# Patient Record
Sex: FEMALE | Race: OTHER
[De-identification: names, ages, dates, MRNs, and addresses within clinical notes are randomized per-mention and may not be internally consistent; named-entity substitution may affect disease eponyms.]

---

## 2018-05-23 ENCOUNTER — RESULT REVIEW (OUTPATIENT)
Age: 48
End: 2018-05-23

## 2018-08-02 PROBLEM — Z00.00 ENCOUNTER FOR PREVENTIVE HEALTH EXAMINATION: Status: ACTIVE | Noted: 2018-08-02

## 2018-08-09 ENCOUNTER — MOBILE ON CALL (OUTPATIENT)
Age: 48
End: 2018-08-09

## 2018-08-23 ENCOUNTER — APPOINTMENT (OUTPATIENT)
Dept: GYNECOLOGIC ONCOLOGY | Facility: CLINIC | Age: 48
End: 2018-08-23
Payer: COMMERCIAL

## 2018-08-23 PROCEDURE — 99203 OFFICE O/P NEW LOW 30 MIN: CPT | Mod: 25

## 2018-08-23 PROCEDURE — 58100 BIOPSY OF UTERUS LINING: CPT | Mod: 59

## 2018-08-23 PROCEDURE — 93976 VASCULAR STUDY: CPT | Mod: 59

## 2018-08-23 PROCEDURE — 76830 TRANSVAGINAL US NON-OB: CPT | Mod: 59

## 2018-09-07 LAB — CORE LAB BIOPSY: NORMAL

## 2018-09-27 ENCOUNTER — APPOINTMENT (OUTPATIENT)
Dept: GYNECOLOGIC ONCOLOGY | Facility: CLINIC | Age: 48
End: 2018-09-27
Payer: COMMERCIAL

## 2018-09-27 PROCEDURE — 99214 OFFICE O/P EST MOD 30 MIN: CPT

## 2019-10-10 ENCOUNTER — APPOINTMENT (OUTPATIENT)
Dept: GYNECOLOGIC ONCOLOGY | Facility: CLINIC | Age: 49
End: 2019-10-10
Payer: COMMERCIAL

## 2019-10-10 DIAGNOSIS — D21.9 BENIGN NEOPLASM OF CONNECTIVE AND OTHER SOFT TISSUE, UNSPECIFIED: ICD-10-CM

## 2019-10-10 DIAGNOSIS — N94.89 OTHER SPECIFIED CONDITIONS ASSOCIATED WITH FEMALE GENITAL ORGANS AND MENSTRUAL CYCLE: ICD-10-CM

## 2019-10-10 PROCEDURE — 76857 US EXAM PELVIC LIMITED: CPT | Mod: 59

## 2019-10-10 PROCEDURE — 93976 VASCULAR STUDY: CPT | Mod: 59

## 2019-10-10 PROCEDURE — 76830 TRANSVAGINAL US NON-OB: CPT | Mod: 59

## 2019-10-10 PROCEDURE — 99214 OFFICE O/P EST MOD 30 MIN: CPT | Mod: 25

## 2019-10-10 NOTE — DISCUSSION/SUMMARY
[FreeTextEntry1] : Discussed that only endometrial problems associated with bleeding will be seen on hysteroscopy D&C. I suspect adenomyosis which will not be diagnosed, but patient adamant she would like to check that first before going for hysterectomy.

## 2019-10-10 NOTE — ASSESSMENT
[FreeTextEntry1] : Pt is a 48 yo with menorrhagia and pelvic congestion syndrome and anemia. D&C hysteroscopy.

## 2019-10-10 NOTE — HISTORY OF PRESENT ILLNESS
[FreeTextEntry1] : Pt is a 48 yo with heavy menstrual bleeding and pelvic congestion syndrome who presents for follow up. She has anemia and would like to have dilation and curettage instead of hysterectomy right away. She would like to see if there is an endometrial cause of bleeding even though i suspect she has adenomyosis.

## 2019-11-13 ENCOUNTER — FORM ENCOUNTER (OUTPATIENT)
Age: 49
End: 2019-11-13

## 2019-11-14 ENCOUNTER — RESULT REVIEW (OUTPATIENT)
Age: 49
End: 2019-11-14

## 2019-11-26 ENCOUNTER — APPOINTMENT (OUTPATIENT)
Dept: GYNECOLOGIC ONCOLOGY | Facility: CLINIC | Age: 49
End: 2019-11-26
Payer: COMMERCIAL

## 2019-11-26 DIAGNOSIS — N92.0 EXCESSIVE AND FREQUENT MENSTRUATION WITH REGULAR CYCLE: ICD-10-CM

## 2019-11-26 PROCEDURE — 99212 OFFICE O/P EST SF 10 MIN: CPT

## 2019-11-26 NOTE — DISCUSSION/SUMMARY
[Clean] : was clean [Dry] : was dry [Intact] : was intact [Erythema] : was not erythematous [Ecchymosis] : was not ecchymotic [Seroma] : had no seroma [None] : had no drainage [Normal Skin] : normal appearance [Firm] : soft [Tender] : nontender [Abnormal Bowel Sounds] : normal bowel sounds [Rebound] : no rebound tenderness [Guarding] : no guarding [Incisional Hernia] : no incisional hernia [Mass] : no palpable mass [Doing Well] : is doing well [Excellent Pain Control] : has excellent pain control [No Sign of Infection] : is showing no signs of infection [0] : 0

## 2019-11-26 NOTE — REASON FOR VISIT
[de-identified] : 11/26/2019 [de-identified] : D&C [de-identified] : Pt presents for post-op check. She reports bleeding after the surgery which now only spotting, but she thinks it is because she is having a period.

## 2019-11-26 NOTE — ASSESSMENT
[FreeTextEntry1] : Pt is a 50 yo s/p D&C for menorrhagia. Pathology benign. Will re-evaluate if her periods are heavy persistently or if the D&C improved her flow and make a decision of she would like to proceed with hysterectomy.

## 2024-03-25 ENCOUNTER — OFFICE (OUTPATIENT)
Dept: URBAN - METROPOLITAN AREA CLINIC 8 | Facility: CLINIC | Age: 54
Setting detail: OPHTHALMOLOGY
End: 2024-03-25
Payer: MEDICAID

## 2024-03-25 DIAGNOSIS — H52.4: ICD-10-CM

## 2024-03-25 DIAGNOSIS — Q13.3: ICD-10-CM

## 2024-03-25 PROCEDURE — 92015 DETERMINE REFRACTIVE STATE: CPT | Performed by: OPHTHALMOLOGY

## 2024-03-25 PROCEDURE — 92014 COMPRE OPH EXAM EST PT 1/>: CPT | Performed by: OPHTHALMOLOGY

## 2024-03-25 PROCEDURE — 92083 EXTENDED VISUAL FIELD XM: CPT | Performed by: OPHTHALMOLOGY

## 2024-03-26 PROBLEM — E22.1 HYPERPROLACTINEMIA: Status: ACTIVE | Noted: 2024-03-25

## 2024-03-26 ASSESSMENT — REFRACTION_MANIFEST
OD_ADD: +2.00
OS_SPHERE: BALANCE
OD_SPHERE: +0.50
OU_VA: 20/20+2
OS_SPHERE: BALANCE
OS_VA1: CF 2FT
OD_CYLINDER: -0.25
OS_ADD: +2.00
OD_VA1: 20/20+2
OS_VA2: CF 2FT
OD_CYLINDER: -0.25
OS_VA1: CF 2FT
OS_VA2: CF 2FT
OS_ADD: +2.00
OD_SPHERE: +0.50
OD_AXIS: 010
OD_AXIS: 010
OD_VA1: 20/20+2
OD_VA2: 20/20(J1+)
OD_ADD: +2.00
OD_VA2: 20/20(J1+)
OU_VA: 20/20+2

## 2024-03-26 ASSESSMENT — REFRACTION_CURRENTRX
OS_SPHERE: +1.25
OD_OVR_VA: 20/
OS_OVR_VA: 20/
OD_VPRISM_DIRECTION: SV
OS_VPRISM_DIRECTION: SV
OD_SPHERE: +1.25

## 2024-03-26 ASSESSMENT — SPHEQUIV_DERIVED
OD_SPHEQUIV: 0.375
OD_SPHEQUIV: 0.375

## 2025-03-12 ENCOUNTER — APPOINTMENT (OUTPATIENT)
Dept: VASCULAR SURGERY | Facility: CLINIC | Age: 55
End: 2025-03-12
Payer: COMMERCIAL

## 2025-03-12 VITALS
SYSTOLIC BLOOD PRESSURE: 102 MMHG | WEIGHT: 124 LBS | HEIGHT: 66 IN | BODY MASS INDEX: 19.93 KG/M2 | DIASTOLIC BLOOD PRESSURE: 64 MMHG

## 2025-03-12 DIAGNOSIS — I83.899 VARICOSE VEINS OF UNSPECIFIED LOWER EXTREMITY WITH OTHER COMPLICATIONS: ICD-10-CM

## 2025-03-12 DIAGNOSIS — R79.89 OTHER SPECIFIED ABNORMAL FINDINGS OF BLOOD CHEMISTRY: ICD-10-CM

## 2025-03-12 DIAGNOSIS — Z82.49 FAMILY HISTORY OF ISCHEMIC HEART DISEASE AND OTHER DISEASES OF THE CIRCULATORY SYSTEM: ICD-10-CM

## 2025-03-12 DIAGNOSIS — M19.90 UNSPECIFIED OSTEOARTHRITIS, UNSPECIFIED SITE: ICD-10-CM

## 2025-03-12 PROCEDURE — 99202 OFFICE O/P NEW SF 15 MIN: CPT

## 2025-03-13 ENCOUNTER — OFFICE (OUTPATIENT)
Dept: URBAN - METROPOLITAN AREA CLINIC 8 | Facility: CLINIC | Age: 55
Setting detail: OPHTHALMOLOGY
End: 2025-03-13
Payer: COMMERCIAL

## 2025-03-13 DIAGNOSIS — H52.4: ICD-10-CM

## 2025-03-13 DIAGNOSIS — Q13.3: ICD-10-CM

## 2025-03-13 DIAGNOSIS — H53.002: ICD-10-CM

## 2025-03-13 PROCEDURE — 92014 COMPRE OPH EXAM EST PT 1/>: CPT | Performed by: OPHTHALMOLOGY

## 2025-03-13 PROCEDURE — 92083 EXTENDED VISUAL FIELD XM: CPT | Performed by: OPHTHALMOLOGY

## 2025-03-13 PROCEDURE — 92015 DETERMINE REFRACTIVE STATE: CPT | Performed by: OPHTHALMOLOGY

## 2025-03-13 ASSESSMENT — VISUAL ACUITY
OD_BCVA: CF 2FT
OS_BCVA: 20/40-2

## 2025-03-13 ASSESSMENT — REFRACTION_AUTOREFRACTION
OD_AXIS: 107
OD_SPHERE: +1.50
OS_AXIS: 116
OD_CYLINDER: -0.50
OS_SPHERE: -9.00
OS_CYLINDER: -3.00

## 2025-03-13 ASSESSMENT — REFRACTION_MANIFEST
OD_AXIS: 107
OD_VA2: 20/20(J1+)
OS_VA2: CF 2FT
OS_ADD: +2.50
OS_VA2: CF 2FT
OU_VA: 20/20
OD_VA1: 20/20
OD_SPHERE: +1.00
OD_VA2: 20/20(J1+)
OD_ADD: +2.50
OS_VA1: CF 2FT
OD_CYLINDER: SPHERE
OS_VA1: CF 2FT
OS_SPHERE: BALANCE
OS_ADD: +2.00
OS_SPHERE: BALANCE
OD_SPHERE: +1.00
OD_VA1: 20/20
OU_VA: 20/20
OD_CYLINDER: -0.25
OD_ADD: +2.00

## 2025-03-13 ASSESSMENT — TONOMETRY
OS_IOP_MMHG: 14
OD_IOP_MMHG: 16

## 2025-03-13 ASSESSMENT — REFRACTION_CURRENTRX
OS_VPRISM_DIRECTION: SV
OS_OVR_VA: 20/
OD_VPRISM_DIRECTION: SV
OS_ADD: +2.50
OD_OVR_VA: 20/
OD_ADD: +2.50

## 2025-03-13 ASSESSMENT — KERATOMETRY
OD_K2POWER_DIOPTERS: 41.75
OS_K2POWER_DIOPTERS: 41.50
OD_AXISANGLE_DEGREES: 102
OD_K1POWER_DIOPTERS: 41.00
METHOD_AUTO_MANUAL: AUTO
OS_K1POWER_DIOPTERS: 39.00
OS_AXISANGLE_DEGREES: 056

## 2025-03-13 ASSESSMENT — CORNEAL SURGICAL SCARRING
OD_SCARRING: STROMAL
OS_SCARRING: CENTRAL STROMAL

## 2025-03-13 ASSESSMENT — CONFRONTATIONAL VISUAL FIELD TEST (CVF)
OS_FINDINGS: FULL
OD_FINDINGS: FULL

## 2025-03-17 PROBLEM — R79.89 PROLACTIN INCREASED: Status: ACTIVE | Noted: 2025-03-17

## 2025-03-17 PROBLEM — M19.90 ARTHRITIS: Status: ACTIVE | Noted: 2025-03-12

## 2025-03-17 PROBLEM — Z82.49 FAMILY HISTORY OF VARICOSE VEINS OF LOWER EXTREMITY: Status: ACTIVE | Noted: 2025-03-12

## 2025-04-08 ENCOUNTER — APPOINTMENT (OUTPATIENT)
Dept: VASCULAR SURGERY | Facility: CLINIC | Age: 55
End: 2025-04-08
Payer: COMMERCIAL

## 2025-04-08 VITALS
DIASTOLIC BLOOD PRESSURE: 54 MMHG | HEIGHT: 66 IN | WEIGHT: 124 LBS | SYSTOLIC BLOOD PRESSURE: 98 MMHG | BODY MASS INDEX: 19.93 KG/M2

## 2025-04-08 DIAGNOSIS — I83.899 VARICOSE VEINS OF UNSPECIFIED LOWER EXTREMITY WITH OTHER COMPLICATIONS: ICD-10-CM

## 2025-04-08 PROCEDURE — 99212 OFFICE O/P EST SF 10 MIN: CPT

## 2025-04-08 PROCEDURE — 93970 EXTREMITY STUDY: CPT
